# Patient Record
Sex: MALE | Race: WHITE | NOT HISPANIC OR LATINO | Employment: FULL TIME | URBAN - METROPOLITAN AREA
[De-identification: names, ages, dates, MRNs, and addresses within clinical notes are randomized per-mention and may not be internally consistent; named-entity substitution may affect disease eponyms.]

---

## 2018-01-23 ENCOUNTER — ALLSCRIPTS OFFICE VISIT (OUTPATIENT)
Dept: OTHER | Facility: OTHER | Age: 53
End: 2018-01-23

## 2018-01-24 NOTE — PROGRESS NOTES
Assessment   1  Rupture of right proximal biceps tendon, initial encounter (840 8) (L08 157U)    Plan   Rupture of right proximal biceps tendon, initial encounter    · Follow-up PRN Evaluation and Treatment  Follow-up  Status: Complete  Done:    [id number] 06:18PM   · Apply an ice pack 4 times a day for 20 minutes the first 2 days ; Status:Complete;   Done:    [id number] 06:18PM    Discussion/Summary      S/p 6 weeks R biceps tear   to good strength and near perfect exam surgical intervention is not recommended at this time was offered to the patient, patient declined at this time formal restrictions at this point prn   attestation:   Jewel Nur, LAT, ATC, am acting as a scribe while in the presence of the attending physician  History of Present Illness   Pt is a 47year old R hand dominate male who presents today with bicep injury sustained at work on 12/5/17  Pt statse he was lidting a tire at work when he felt a pop in his R shoulder  Pt c/o gerneraleized sorness  waking up at night if he is laying on it wrong  Pt denies numbness or tingling  Pt has been still going to work on modified duty at this point in time  [employer] ordered xray and mri and insurance scheduled him here  Pt denies any previous history of injury to R shoulder  Pt c/o stiffness and tightness  past medical history, medications, allergies, and review of systems have been read and reviewed on the chart and have been updated  of Systems: Negative Negative except as above As above Negative except as above Negative              Active Problems   1  Allergic rhinitis (477 9) (J30 9)   2  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)   3  Hyperglycemia (790 29) (R73 9)   4  Obesity (278 00) (E66 9)   5  Osteochondral lesion of talar dome (733 90) (M89 9,M94 9)   6  Postop check (V67 00) (Z09)   7   Umbilical hernia (930 2) (K42 9)    Past Medical History    · History of Acute appendicitis (540 9) (K35 80)   · History of Atelectasis of both lungs (518 0) (J98 11)   · History of Avascular necrosis of hip (733 42) (M87 059)   · History of Colon cancer screening (V76 51) (Z12 11)   · History of Diverticulosis (562 10) (K57 90)   · History of colonic polyps (V12 72) (Z86 010)   · History of hemorrhoids (V13 89) (Z87 19)   · History of seasonal allergies (V15 09) (Z88 9)   · History of Perforated appendicitis (540 0) (K35 2)   · History of Preop examination (V72 84) (Z01 818)    Surgical History    · History of Ankle Surgery   · History of Complete Colonoscopy   · History of Laparoscopic Appendectomy   · History of Total Hip Replacement   · History of Umbilical Hernia Repair    Family History   Mother    · Family history of Colon cancer  Father    · Family history of cardiac disorder (V17 49) (Z80 55)  Brother    · Family history of Bladder polyps    Social History    · Alcohol use (V49 89) (Z78 9)   · social   · Current some day smoker (305 1) (F17 200)   · rare cigar    Current Meds    1  Fluticasone Propionate 50 MCG/ACT Nasal Suspension; USE 2 SPRAYS IN EACH     NOSTRIL ONCE DAILY; Therapy: [id number] to (Last Rx:03Mar2016)  Requested for: 07GHL2849 Ordered   2  ZyrTEC Allergy 10 MG Oral Capsule; take 1 capsule daily; Therapy: (Recorded:10Aug2016) to Recorded    Allergies   1  No Known Drug Allergies    Vitals    Recorded: 10GDQ3108 84:58OC   Systolic 613, LUE, Sitting   Diastolic 84, LUE, Sitting   Weight 242 lb    BMI Calculated 40 27   BSA Calculated 2 15     Physical Exam      Right Shoulder: Appearance: Normal except  Tenderness: None except the  ROM: Full except as noted: Motor: Normal except as noted:   Special Tests: Negative except  (IR on the R L1 vs on the L T8  negative hook test  negative speeds  positive cross body adduction  tenderness to [facility] jt  negative belly press  70 degrees bilaterally ER  bilaterally 170 elevation and abduction  marinelli and neer positive   biceps 5/5 with no deformity)           Constitutional - General appearance: Normal       Musculoskeletal - Gait and station: Normal -- Digits and nails: Normal -- Muscle strength/tone: Normal       Cardiovascular - Pulses: Normal -- Examination of extremities for edema and/or varicosities: Normal       Skin - Skin and subcutaneous tissue: Normal       Neurologic - Sensation: Normal       Psychiatric - Orientation to person, place, and time: Normal -- Mood and affect: Normal       Eyes      Conjunctiva and lids: Normal        Pupils and irises: Normal        Message   Return to work or school:    Lizett John is under my professional care  He was seen in my office on 1/23/18    He is able to return to work on  1/24/18    He can perform work without limitations         Larry Coyle MD       Signatures    Electronically signed by : MARK Bravo ; Jan 23 2018  6:18PM EST                       (Author)

## 2018-01-24 NOTE — MISCELLANEOUS
Message  Return to work or school:   Racquel Oneill is under my professional care  He was seen in my office on 1/23/18   He is able to return to work on  1/24/18    He can perform work without limitations      Joshua Hunter MD       Signatures   Electronically signed by : MARK Gann ; Jan 23 2018  6:18PM EST                       (Author)

## 2021-10-26 ENCOUNTER — OFFICE VISIT (OUTPATIENT)
Dept: URGENT CARE | Facility: MEDICAL CENTER | Age: 56
End: 2021-10-26
Payer: COMMERCIAL

## 2021-10-26 VITALS
TEMPERATURE: 99.9 F | SYSTOLIC BLOOD PRESSURE: 124 MMHG | OXYGEN SATURATION: 96 % | RESPIRATION RATE: 16 BRPM | HEART RATE: 82 BPM | DIASTOLIC BLOOD PRESSURE: 80 MMHG

## 2021-10-26 DIAGNOSIS — J01.10 ACUTE FRONTAL SINUSITIS, RECURRENCE NOT SPECIFIED: ICD-10-CM

## 2021-10-26 DIAGNOSIS — J06.9 ACUTE URI: Primary | ICD-10-CM

## 2021-10-26 PROCEDURE — U0003 INFECTIOUS AGENT DETECTION BY NUCLEIC ACID (DNA OR RNA); SEVERE ACUTE RESPIRATORY SYNDROME CORONAVIRUS 2 (SARS-COV-2) (CORONAVIRUS DISEASE [COVID-19]), AMPLIFIED PROBE TECHNIQUE, MAKING USE OF HIGH THROUGHPUT TECHNOLOGIES AS DESCRIBED BY CMS-2020-01-R: HCPCS | Performed by: FAMILY MEDICINE

## 2021-10-26 PROCEDURE — U0005 INFEC AGEN DETEC AMPLI PROBE: HCPCS | Performed by: FAMILY MEDICINE

## 2021-10-26 PROCEDURE — S9083 URGENT CARE CENTER GLOBAL: HCPCS | Performed by: FAMILY MEDICINE

## 2021-10-26 PROCEDURE — G0382 LEV 3 HOSP TYPE B ED VISIT: HCPCS | Performed by: FAMILY MEDICINE

## 2021-10-26 RX ORDER — FLUTICASONE PROPIONATE 50 MCG
2 SPRAY, SUSPENSION (ML) NASAL DAILY
COMMUNITY
Start: 2016-03-03

## 2021-10-26 RX ORDER — AMOXICILLIN 875 MG/1
875 TABLET, COATED ORAL 2 TIMES DAILY
Qty: 14 TABLET | Refills: 0 | Status: SHIPPED | OUTPATIENT
Start: 2021-10-26 | End: 2021-11-02

## 2021-10-28 ENCOUNTER — TELEPHONE (OUTPATIENT)
Dept: URGENT CARE | Facility: MEDICAL CENTER | Age: 56
End: 2021-10-28

## 2021-10-28 LAB — SARS-COV-2 RNA RESP QL NAA+PROBE: POSITIVE

## 2023-04-06 ENCOUNTER — OFFICE VISIT (OUTPATIENT)
Dept: URGENT CARE | Age: 58
End: 2023-04-06

## 2023-04-06 VITALS
TEMPERATURE: 97.5 F | SYSTOLIC BLOOD PRESSURE: 135 MMHG | OXYGEN SATURATION: 97 % | RESPIRATION RATE: 18 BRPM | DIASTOLIC BLOOD PRESSURE: 65 MMHG | HEART RATE: 68 BPM

## 2023-04-06 DIAGNOSIS — R39.9 UTI SYMPTOMS: Primary | ICD-10-CM

## 2023-04-06 PROBLEM — S46.211A RUPTURE OF RIGHT PROXIMAL BICEPS TENDON: Status: ACTIVE | Noted: 2018-01-23

## 2023-04-06 LAB
SL AMB  POCT GLUCOSE, UA: NEGATIVE
SL AMB LEUKOCYTE ESTERASE,UA: ABNORMAL
SL AMB POCT BILIRUBIN,UA: NEGATIVE
SL AMB POCT BLOOD,UA: NEGATIVE
SL AMB POCT CLARITY,UA: CLEAR
SL AMB POCT COLOR,UA: ABNORMAL
SL AMB POCT KETONES,UA: NEGATIVE
SL AMB POCT NITRITE,UA: NEGATIVE
SL AMB POCT PH,UA: 6
SL AMB POCT SPECIFIC GRAVITY,UA: 1
SL AMB POCT URINE PROTEIN: NEGATIVE
SL AMB POCT UROBILINOGEN: 0.2

## 2023-04-06 RX ORDER — CEPHALEXIN 500 MG/1
500 CAPSULE ORAL EVERY 6 HOURS SCHEDULED
Qty: 28 CAPSULE | Refills: 0 | Status: SHIPPED | OUTPATIENT
Start: 2023-04-06 | End: 2023-04-13

## 2023-04-06 NOTE — PROGRESS NOTES
[facility]        NAME: Mahad Hansen is a 61 y o  male  : 1965    MRN: [id number]  DATE: 2023  TIME: 8:39 AM      Assessment and Plan     UTI symptoms [R39 9]  1  UTI symptoms  POCT urine dip    Urine culture    cephalexin (KEFLEX) 500 mg capsule        poct urine dip shows moderate amount of leukocytes  Urine culture sent  No prior urine culture for comparison  Patient Instructions     Take antibiotic as prescribed  Recommend probiotic use while taking antibiotic  Urine culture sent; the results will appear in your mychart  Acetaminophen for pain  Follow-up with PCP in 3-5 days  Go to ER if symptoms worsen  Chief Complaint     Chief Complaint   Patient presents with   • Possible UTI     Pt reports having a stomach virus 2 weeks ago  Now having burning and cramping with urination  Pt had 2 leftover amoxicillin from a dental procedure and took those which helped  History of Present Illness     Patient is a 62-year-old male who presents with dysuria and bladder cramping for 4 days  Denies flank pain  Reports history of UTI's  Denies fever or chills  Denies nausea, vomiting, diarrhea  Denies urinary retention  Review of Systems     Review of Systems   Constitutional: Negative for chills and fever  Gastrointestinal: Positive for abdominal pain  Negative for diarrhea, nausea and vomiting  Genitourinary: Positive for dysuria  Negative for flank pain, hematuria and urgency  Musculoskeletal: Negative for back pain  All other systems reviewed and are negative  Current Medications       Current Outpatient Medications:   •  cephalexin (KEFLEX) 500 mg capsule, Take 1 capsule (500 mg total) by mouth every 6 (six) hours for 7 days, Disp: 28 capsule, Rfl: 0  •  cetirizine (ZyrTEC) 10 mg tablet, Take 10 mg by mouth daily  , Disp: , Rfl:   •  fluticasone (FLONASE) 50 mcg/act nasal spray, 2 sprays into each nostril daily (Patient not taking: Reported on 4/6/2023), Disp: , Rfl:     Current Allergies     Allergies as of 04/06/2023   • (No Known Allergies)              The following portions of the patient's history were reviewed and updated as appropriate: allergies, current medications, past family history, past medical history, past social history, past surgical history and problem list      Past Medical History:   Diagnosis Date   • No known health problems        Past Surgical History:   Procedure Laterality Date   • BONE GRAFT      Rt ankle   • HERNIA REPAIR     • JOINT REPLACEMENT      Lt hip   • IA LAPAROSCOPIC APPENDECTOMY N/A 8/1/2016    Procedure: APPENDECTOMY LAPAROSCOPIC;  Surgeon: Joycelyn Mortimer, MD;  Location: [facility];  Service: General       History reviewed  No pertinent family history  Medications have been verified  Objective     /65   Pulse 68   Temp 97 5 °F (36 4 °C) (Tympanic)   Resp 18   SpO2 97%   No LMP for male patient  Physical Exam     Physical Exam  Vitals and nursing note reviewed  Constitutional:       General: He is not in acute distress  Appearance: Normal appearance  He is not ill-appearing, toxic-appearing or diaphoretic  Cardiovascular:      Rate and Rhythm: Normal rate  Pulses: Normal pulses  Heart sounds: Normal heart sounds, S1 normal and S2 normal    Pulmonary:      Effort: Pulmonary effort is normal       Breath sounds: Normal breath sounds and air entry  No stridor, decreased air movement or transmitted upper airway sounds  No decreased breath sounds, wheezing, rhonchi or rales  Abdominal:      General: Abdomen is flat  Bowel sounds are normal       Palpations: Abdomen is soft  Tenderness: There is abdominal tenderness in the suprapubic area  There is no right CVA tenderness or left CVA tenderness  Skin:     General: Skin is warm  Capillary Refill: Capillary refill takes less than 2 seconds  Neurological:      General: No focal deficit present        Mental Status: He is alert  Psychiatric:         Mood and Affect: Mood normal          Behavior: Behavior normal          Thought Content:  Thought content normal          Judgment: Judgment normal

## 2023-04-06 NOTE — LETTER
April 6, 2023     Patient: Galilea Andrade   YOB: 1965   Date of Visit: 4/6/2023       To Whom It May Concern:     It is my medical opinion that Alda Ferraro may return to work on  4/8/23         Sincerely,        BI Nguyen    CC: No Recipients

## 2023-04-06 NOTE — PATIENT INSTRUCTIONS
Take antibiotic as prescribed  Recommend probiotic use while taking antibiotic  Urine culture sent; the results will appear in your mychart  Hydrate and rest    Acetaminophen for pain  Follow-up with PCP in 3-5 days  Go to ER if symptoms worsen

## 2023-04-07 LAB — BACTERIA UR CULT: NORMAL
